# Patient Record
Sex: MALE | Race: ASIAN | NOT HISPANIC OR LATINO | URBAN - METROPOLITAN AREA
[De-identification: names, ages, dates, MRNs, and addresses within clinical notes are randomized per-mention and may not be internally consistent; named-entity substitution may affect disease eponyms.]

---

## 2018-06-08 ENCOUNTER — AMBULATORY - HEALTHEAST (OUTPATIENT)
Dept: LAB | Facility: CLINIC | Age: 23
End: 2018-06-08

## 2018-06-08 DIAGNOSIS — Z02.89 REFUGEE HEALTH EXAMINATION: ICD-10-CM

## 2018-06-08 LAB
ALBUMIN SERPL-MCNC: 4.1 G/DL (ref 3.5–5)
ALP SERPL-CCNC: 71 U/L (ref 45–120)
ALT SERPL W P-5'-P-CCNC: 21 U/L (ref 0–45)
ANION GAP SERPL CALCULATED.3IONS-SCNC: 10 MMOL/L (ref 5–18)
AST SERPL W P-5'-P-CCNC: 19 U/L (ref 0–40)
BASOPHILS # BLD AUTO: 0 THOU/UL (ref 0–0.2)
BASOPHILS NFR BLD AUTO: 0 % (ref 0–2)
BILIRUB SERPL-MCNC: 0.8 MG/DL (ref 0–1)
BUN SERPL-MCNC: 13 MG/DL (ref 8–22)
CALCIUM SERPL-MCNC: 10.3 MG/DL (ref 8.5–10.5)
CHLORIDE BLD-SCNC: 102 MMOL/L (ref 98–107)
CO2 SERPL-SCNC: 30 MMOL/L (ref 22–31)
CREAT SERPL-MCNC: 0.85 MG/DL (ref 0.7–1.3)
EOSINOPHIL # BLD AUTO: 0.3 THOU/UL (ref 0–0.4)
EOSINOPHIL NFR BLD AUTO: 4 % (ref 0–6)
ERYTHROCYTE [DISTWIDTH] IN BLOOD BY AUTOMATED COUNT: 13 % (ref 11–14.5)
GFR SERPL CREATININE-BSD FRML MDRD: >60 ML/MIN/1.73M2
GLUCOSE BLD-MCNC: 85 MG/DL (ref 70–125)
HCT VFR BLD AUTO: 48.4 % (ref 40–54)
HCV AB SERPL QL IA: NEGATIVE
HGB BLD-MCNC: 16.3 G/DL (ref 14–18)
HIV 1+2 AB+HIV1 P24 AG SERPL QL IA: NEGATIVE
LYMPHOCYTES # BLD AUTO: 1.7 THOU/UL (ref 0.8–4.4)
LYMPHOCYTES NFR BLD AUTO: 22 % (ref 20–40)
MCH RBC QN AUTO: 29.2 PG (ref 27–34)
MCHC RBC AUTO-ENTMCNC: 33.7 G/DL (ref 32–36)
MCV RBC AUTO: 87 FL (ref 80–100)
MONOCYTES # BLD AUTO: 0.7 THOU/UL (ref 0–0.9)
MONOCYTES NFR BLD AUTO: 9 % (ref 2–10)
NEUTROPHILS # BLD AUTO: 4.9 THOU/UL (ref 2–7.7)
NEUTROPHILS NFR BLD AUTO: 65 % (ref 50–70)
PLATELET # BLD AUTO: 306 THOU/UL (ref 140–440)
PMV BLD AUTO: 9.5 FL (ref 8.5–12.5)
POTASSIUM BLD-SCNC: 4.8 MMOL/L (ref 3.5–5)
PROT SERPL-MCNC: 7.6 G/DL (ref 6–8)
RBC # BLD AUTO: 5.59 MILL/UL (ref 4.4–6.2)
SODIUM SERPL-SCNC: 142 MMOL/L (ref 136–145)
WBC: 7.6 THOU/UL (ref 4–11)

## 2018-06-09 LAB — HBV SURFACE AG SERPL QL IA: NEGATIVE

## 2018-06-10 LAB — STRONGYLOIDES IGG SER IA-ACNC: 0.39 IV

## 2018-06-11 LAB
HAV IGG SER QL IA: POSITIVE
HBV CORE AB SERPL QL IA: NEGATIVE
HEPATITIS B SURFACE ANTIBODY LHE- HISTORICAL: POSITIVE
QTF INTERPRETATION: NORMAL
QTF MITOGEN - NIL: 8.43 IU/ML
QTF NIL: 0.1 IU/ML
QTF RESULT: NEGATIVE
QTF TB ANTIGEN - NIL: 0.06 IU/ML
VZV IGG SER QL IA: NEGATIVE

## 2018-06-12 LAB — SCHISTOSOMA AB, IGG, S: ABNORMAL

## 2018-06-27 ENCOUNTER — OFFICE VISIT - HEALTHEAST (OUTPATIENT)
Dept: FAMILY MEDICINE | Facility: CLINIC | Age: 23
End: 2018-06-27

## 2018-06-27 DIAGNOSIS — Z78.9 HEPATITIS B IMMUNE: ICD-10-CM

## 2018-06-27 DIAGNOSIS — Z00.00 ROUTINE GENERAL MEDICAL EXAMINATION AT A HEALTH CARE FACILITY: ICD-10-CM

## 2018-06-27 DIAGNOSIS — Z78.9 HEPATITIS A IMMUNE: ICD-10-CM

## 2018-06-27 DIAGNOSIS — Z02.89 REFUGEE HEALTH EXAMINATION: ICD-10-CM

## 2018-06-27 DIAGNOSIS — B65.9 SCHISTOSOMA: ICD-10-CM

## 2018-06-27 ASSESSMENT — MIFFLIN-ST. JEOR: SCORE: 1382.48

## 2018-08-23 ENCOUNTER — COMMUNICATION - HEALTHEAST (OUTPATIENT)
Dept: FAMILY MEDICINE | Facility: CLINIC | Age: 23
End: 2018-08-23

## 2018-09-10 ENCOUNTER — AMBULATORY - HEALTHEAST (OUTPATIENT)
Dept: FAMILY MEDICINE | Facility: CLINIC | Age: 23
End: 2018-09-10

## 2018-09-10 DIAGNOSIS — Z23 NEED FOR VARICELLA VACCINE: ICD-10-CM

## 2021-06-01 VITALS — BODY MASS INDEX: 21.02 KG/M2 | HEIGHT: 62 IN | WEIGHT: 114.25 LBS

## 2021-06-16 PROBLEM — Z78.9 HEPATITIS B IMMUNE: Status: ACTIVE | Noted: 2018-06-27

## 2021-06-16 PROBLEM — B65.9 SCHISTOSOMA: Status: ACTIVE | Noted: 2018-06-27

## 2021-06-16 PROBLEM — Z78.9 HEPATITIS A IMMUNE: Status: ACTIVE | Noted: 2018-06-27

## 2021-06-18 NOTE — PROGRESS NOTES
REFUGEE HEALTH SCREEN    Subjective:      Ryan Laughlin is a 23 y.o. male who presents for a refugee screening exam.     Concerns: none    Since arrival:  Fever: No  Abdo pain: No  Diarrhea: No  Sob: No  Cough: No   sx: No  Vision problem?:none  Hearing problem?:none        Past Medical History:   Diagnosis Date     Current smoker 2018    about 8 years of smoking 2 cigs per day; wants to quit and is encouraged to do so         Ob/GynHx:  No LMP for male patient.  Birth control: currently, none needed    Meds:  Praziquantel once    Social History     Social History     Marital status: Single     Spouse name: N/A     Number of children: 0     Years of education: 4     Social History Main Topics     Smoking status: Current Every Day Smoker     Packs/day: 0.20     Types: Cigarettes     Start date: 6/27/2012     Smokeless tobacco: Never Used      Comment: chews betal nut     Alcohol use No     Drug use: No     Sexual activity: Not Currently     Partners: Female      Comment: encouraged responsible contraception; had a girlfriend [place]     Other Topics Concern     None     Social History Narrative    As of 7/2018        [place]rival in [place]: 5/2018        Living situation: lives in an apartment with father's sister, her  and her 2 kids (she has other kids but they do not live at home), no pets            Family not in [place]: mom and dad are [place]; brother and sister are also in [place]    Family in [place]: father's sister; mother's sister (lives in another state)            Past employment: farmer            Place of birth: [facility]            Education: 4th grade; can speak and read [place]            Latter-day: Taoism           Family Med Hx:  Section completed      Immunization History   Administered Date(s) Administered     Hep B, Adult 06/27/2018     Tdap 06/27/2018     Varicella 06/27/2018       REVIEW OF SYSTEMS  General:  No weight changes, fatigue  HEENT:  no HA,  no vision changes, URI sx  Respiratory:   "no cough, dyspnea  Cardiovascular: no chest pain, palpitations  Gastrointestinal: no nausea/vomiting, diarrhea/constipation, melena  : no dysuria  Neurologic: no seizures, focal weakness, tremors  Skin: no rashes             Objective:         Vitals:    06/27/18 1415   BP: 110/60   Pulse: 69   Resp: 22   Temp: 97.9  F (36.6  C)   TempSrc: Oral   SpO2: 96%   Weight: 114 lb 4 oz (51.8 kg)   Height: 5' 2\" (1.575 m)     Body mass index is 20.9 kg/(m^2).    OBJECTIVE:  Vitals listed above  General appearance: pleasant, hydrated, nontoxic-appearing  ENT: PERRL, throat clear  Neck: neck supple, no lymphadenopathy, no thyromegaly  Lungs: lungs clear to auscultation bilaterally, no wheezes or rhonchi  Heart: regular rate and rhythm, no murmurs  Abdomen: +BS, soft, not distended/nontender  Neuro: no focal deficits, CN II-XII grossly intact, alert and oriented  Psych:  Mood seems good      Recent Results (from the past 672 hour(s))   QTF-Mycobacterium tuberculosis by QuantiFERON-TB Gold    Collection Time: 06/08/18  9:29 AM   Result Value Ref Range    QTF RESULT Negative Negative    QTF INTREPRETATION       No interferon-gamma response to M. tuberculosis antigens was detected.  Infecton with M. tuberculosis is unlikely.  A negative result alone does not exclude infection with M. tuberculosis    QTF NIL 0.10 IU/mL    QTF TB ANTIGEN - NIL 0.06 IU/mL    QTF MITOGEN - NIL 8.43 IU/mL   Hepatitis B Surface Antigen (HBsAG)    Collection Time: 06/08/18  9:29 AM   Result Value Ref Range    Hepatitis B Surface Ag Negative Negative   Hepatitis B Surface Antibody (Anti-HBs)    Collection Time: 06/08/18  9:29 AM   Result Value Ref Range    Hep B Surface Antibody Positive (!) Negative   Hepatitis B Core Antibody (Anti-HBc)    Collection Time: 06/08/18  9:29 AM   Result Value Ref Range    Hep B Core Total Ab Negative Negative   Hepatitis C Antibody (Anti-HCV)    Collection Time: 06/08/18  9:29 AM   Result Value Ref Range    Hepatitis C Ab " Negative Negative   Hepatitis A Immune Status    Collection Time: 06/08/18  9:29 AM   Result Value Ref Range    Hepatitis A IgG (Immune Status) Positive Positive   HIV Antigen/Antibody Screening Cascade    Collection Time: 06/08/18  9:29 AM   Result Value Ref Range    HIV Antigen / Antibody Negative Negative   Comprehensive Metabolic Panel    Collection Time: 06/08/18  9:29 AM   Result Value Ref Range    Sodium 142 136 - 145 mmol/L    Potassium 4.8 3.5 - 5.0 mmol/L    Chloride 102 98 - 107 mmol/L    CO2 30 22 - 31 mmol/L    Anion Gap, Calculation 10 5 - 18 mmol/L    Glucose 85 70 - 125 mg/dL    BUN 13 8 - 22 mg/dL    Creatinine 0.85 0.70 - 1.30 mg/dL    GFR MDRD Af Amer >60 >60 mL/min/1.73m2    GFR MDRD Non Af Amer >60 >60 mL/min/1.73m2    Bilirubin, Total 0.8 0.0 - 1.0 mg/dL    Calcium 10.3 8.5 - 10.5 mg/dL    Protein, Total 7.6 6.0 - 8.0 g/dL    Albumin 4.1 3.5 - 5.0 g/dL    Alkaline Phosphatase 71 45 - 120 U/L    AST 19 0 - 40 U/L    ALT 21 0 - 45 U/L   Varicella Zoster Antibody, IgG    Collection Time: 06/08/18  9:29 AM   Result Value Ref Range    Varicella Zoster Antibody IgG Negative    Strongyloides Antibody, IgG By NASREEN, Serum    Collection Time: 06/08/18  9:29 AM   Result Value Ref Range    Strongyloides Antibody, IgG By NASREEN 0.39 <=0.99 IV   Schistosoma Species Antibody, IgG    Collection Time: 06/08/18  9:29 AM   Result Value Ref Range    Schistosoma Ab, IgG, S Equivocal (!)    HM1 (CBC with Diff)    Collection Time: 06/08/18  9:29 AM   Result Value Ref Range    WBC 7.6 4.0 - 11.0 thou/uL    RBC 5.59 4.40 - 6.20 mill/uL    Hemoglobin 16.3 14.0 - 18.0 g/dL    Hematocrit 48.4 40.0 - 54.0 %    MCV 87 80 - 100 fL    MCH 29.2 27.0 - 34.0 pg    MCHC 33.7 32.0 - 36.0 g/dL    RDW 13.0 11.0 - 14.5 %    Platelets 306 140 - 440 thou/uL    MPV 9.5 8.5 - 12.5 fL    Neutrophils % 65 50 - 70 %    Lymphocytes % 22 20 - 40 %    Monocytes % 9 2 - 10 %    Eosinophils % 4 0 - 6 %    Basophils % 0 0 - 2 %    Neutrophils  Absolute 4.9 2.0 - 7.7 thou/uL    Lymphocytes Absolute 1.7 0.8 - 4.4 thou/uL    Monocytes Absolute 0.7 0.0 - 0.9 thou/uL    Eosinophils Absolute 0.3 0.0 - 0.4 thou/uL    Basophils Absolute 0.0 0.0 - 0.2 thou/uL             Mental Health screening:  Q1. Mood low or feeling sad: No  Q2, Thinking too much: No  Sleeping well: Yes  Q3. Bad dreams/nightmares: No  Q4. Have you been avoiding situations that remind you of the past? Yes, avoiding fights  Q5: Are there things that make it difficult to gurinder what you need to do on a daily basis or be able to School/work/daily life: No  Referral Indicated: No   (?? no, refused, yes - internal, yes- external)    Assessment/plan:     Refugee Screening - Reviewed overseas paperwork.  Confirmed pre-departure anti-parasite treatment.  Reviewed refugee screening labs.  Immunization review and update done, see flowsheet.  Refugee mental health screen done, see results above.  Reviewed healthy lifestyle issues and resettlement issues.  Encouraged dental follow-up in coordination with [facility].      Diagnoses and all orders for this visit:    Routine general medical examination at a health care facility    Refugee health examination  -     Tdap vaccine,  8yo or older,  IM  -     Hepatitis B Vaccine Age 20 years and above  -     Varicella vaccine subq    Schistosoma    Other orders  -     praziquantel (BILTRICIDE) 600 mg tablet; Take 1 tablet (600 mg total) by mouth once for 1 dose.  Dispense: 4 tablet; Refill: 0      1. Px - 23 y.o. male    Shots: varicella,  Hep B #3, tetanus #3; return for varicella #2    2. Refugee screening exam - labs as noted below  3. Counseled to quit smoking, an additional 10 min spent discussing the need to quit, methods to quit, etc.       Teaching about [facility]/[place] phone line  Handouts given